# Patient Record
Sex: MALE | URBAN - METROPOLITAN AREA
[De-identification: names, ages, dates, MRNs, and addresses within clinical notes are randomized per-mention and may not be internally consistent; named-entity substitution may affect disease eponyms.]

---

## 2020-04-08 ENCOUNTER — APPOINTMENT (RX ONLY)
Dept: URBAN - METROPOLITAN AREA CLINIC 22 | Facility: CLINIC | Age: 40
Setting detail: DERMATOLOGY
End: 2020-04-08

## 2020-04-08 DIAGNOSIS — Z71.89 OTHER SPECIFIED COUNSELING: ICD-10-CM

## 2020-04-08 DIAGNOSIS — L0391 CELLULITIS AND ABSCESS OF UNSPECIFIED SITES: ICD-10-CM

## 2020-04-08 DIAGNOSIS — L81.4 OTHER MELANIN HYPERPIGMENTATION: ICD-10-CM

## 2020-04-08 DIAGNOSIS — L0390 CELLULITIS AND ABSCESS OF UNSPECIFIED SITES: ICD-10-CM

## 2020-04-08 DIAGNOSIS — D22 MELANOCYTIC NEVI: ICD-10-CM

## 2020-04-08 PROBLEM — L02.415 CUTANEOUS ABSCESS OF RIGHT LOWER LIMB: Status: ACTIVE | Noted: 2020-04-08

## 2020-04-08 PROBLEM — D22.5 MELANOCYTIC NEVI OF TRUNK: Status: ACTIVE | Noted: 2020-04-08

## 2020-04-08 PROCEDURE — 10061 I&D ABSCESS COMP/MULTIPLE: CPT

## 2020-04-08 PROCEDURE — 99203 OFFICE O/P NEW LOW 30 MIN: CPT | Mod: 25

## 2020-04-08 PROCEDURE — ? COUNSELING

## 2020-04-08 PROCEDURE — ? INCISION AND DRAINAGE

## 2020-04-08 PROCEDURE — ? ORDER TESTS

## 2020-04-08 PROCEDURE — ? PRESCRIPTION

## 2020-04-08 ASSESSMENT — LOCATION SIMPLE DESCRIPTION DERM
LOCATION SIMPLE: RIGHT THIGH
LOCATION SIMPLE: ABDOMEN

## 2020-04-08 ASSESSMENT — LOCATION ZONE DERM
LOCATION ZONE: LEG
LOCATION ZONE: TRUNK

## 2020-04-08 ASSESSMENT — LOCATION DETAILED DESCRIPTION DERM
LOCATION DETAILED: EPIGASTRIC SKIN
LOCATION DETAILED: RIGHT ANTERIOR PROXIMAL THIGH

## 2020-04-08 NOTE — PROCEDURE: INCISION AND DRAINAGE
Detail Level: Detailed
Lesion Type: Cyst
I&D Type: complicated
Method: 11 blade
Curette: Yes
Include Sutures?: No
Anesthesia Type: 1% lidocaine with epinephrine and a 1:10 solution of 8.4% sodium bicarbonate
Anesthesia Volume In Cc: 1
Packing?: iodoform packing strips
Size Of Lesion In Cm (Optional But May Be Required For Some Insurances): 0
Drainage Amount?: moderate
Drainage Type?: bloody and cyst-like
Wound Care: Petrolatum
Dressing: dry sterile dressing
Curette Text (Optional): After the contents were expressed a curette was used to partially remove the cyst wall.
Epidermal Sutures: 4-0 Ethilon
Epidermal Closure: simple interrupted
Suture Text: The incision was partially closed with
Consent was obtained and risks were reviewed including but not limited to delayed wound healing, infection, need for multiple I and D's, and pain.
Post-Care Instructions: I reviewed with the patient in detail post-care instructions. Patient should keep wound covered and call the office should any redness, pain, swelling or worsening occur.

## 2020-04-08 NOTE — PROCEDURE: ORDER TESTS
Performing Laboratory: [phone]
Billing Type: Third-Party Bill
Bill For Surgical Tray: no
Expected Date Of Service: 04/08/2020

## 2021-01-22 ENCOUNTER — PRE-ADMISSION TESTING (OUTPATIENT)
Dept: ADMISSIONS | Facility: MEDICAL CENTER | Age: 41
End: 2021-01-22
Attending: ORTHOPAEDIC SURGERY
Payer: COMMERCIAL

## 2021-01-22 DIAGNOSIS — Z01.812 PRE-OPERATIVE LABORATORY EXAMINATION: ICD-10-CM

## 2021-01-22 DIAGNOSIS — Z01.810 PRE-OPERATIVE CARDIOVASCULAR EXAMINATION: ICD-10-CM

## 2021-01-22 LAB
ANION GAP SERPL CALC-SCNC: 7 MMOL/L (ref 7–16)
BUN SERPL-MCNC: 18 MG/DL (ref 8–22)
CALCIUM SERPL-MCNC: 9.4 MG/DL (ref 8.5–10.5)
CHLORIDE SERPL-SCNC: 100 MMOL/L (ref 96–112)
CO2 SERPL-SCNC: 26 MMOL/L (ref 20–33)
CREAT SERPL-MCNC: 0.86 MG/DL (ref 0.5–1.4)
EKG IMPRESSION: NORMAL
ERYTHROCYTE [DISTWIDTH] IN BLOOD BY AUTOMATED COUNT: 43.3 FL (ref 35.9–50)
GLUCOSE SERPL-MCNC: 96 MG/DL (ref 65–99)
HCT VFR BLD AUTO: 47.2 % (ref 42–52)
HGB BLD-MCNC: 15.6 G/DL (ref 14–18)
MCH RBC QN AUTO: 30.9 PG (ref 27–33)
MCHC RBC AUTO-ENTMCNC: 33.1 G/DL (ref 33.7–35.3)
MCV RBC AUTO: 93.5 FL (ref 81.4–97.8)
PLATELET # BLD AUTO: 190 K/UL (ref 164–446)
PMV BLD AUTO: 11.6 FL (ref 9–12.9)
POTASSIUM SERPL-SCNC: 4.1 MMOL/L (ref 3.6–5.5)
RBC # BLD AUTO: 5.05 M/UL (ref 4.7–6.1)
SODIUM SERPL-SCNC: 133 MMOL/L (ref 135–145)
WBC # BLD AUTO: 7.3 K/UL (ref 4.8–10.8)

## 2021-01-22 PROCEDURE — 80048 BASIC METABOLIC PNL TOTAL CA: CPT

## 2021-01-22 PROCEDURE — 93005 ELECTROCARDIOGRAM TRACING: CPT

## 2021-01-22 PROCEDURE — 93010 ELECTROCARDIOGRAM REPORT: CPT | Performed by: INTERNAL MEDICINE

## 2021-01-22 PROCEDURE — 36415 COLL VENOUS BLD VENIPUNCTURE: CPT

## 2021-01-22 PROCEDURE — 85027 COMPLETE CBC AUTOMATED: CPT

## 2021-01-22 NOTE — PREPROCEDURE INSTRUCTIONS
Pt instructed to hold vitamins/supplements and NSAIDS per anesthesia protocol/MD instructions. Pt not currently taking any prescriptions

## 2021-01-28 ENCOUNTER — ANESTHESIA EVENT (OUTPATIENT)
Dept: SURGERY | Facility: MEDICAL CENTER | Age: 41
End: 2021-01-28
Payer: COMMERCIAL

## 2021-01-28 ENCOUNTER — ANESTHESIA (OUTPATIENT)
Dept: SURGERY | Facility: MEDICAL CENTER | Age: 41
End: 2021-01-28
Payer: COMMERCIAL

## 2021-01-28 ENCOUNTER — HOSPITAL ENCOUNTER (OUTPATIENT)
Facility: MEDICAL CENTER | Age: 41
End: 2021-01-28
Attending: ORTHOPAEDIC SURGERY | Admitting: ORTHOPAEDIC SURGERY
Payer: COMMERCIAL

## 2021-01-28 VITALS
BODY MASS INDEX: 21.87 KG/M2 | RESPIRATION RATE: 10 BRPM | WEIGHT: 170.42 LBS | SYSTOLIC BLOOD PRESSURE: 148 MMHG | HEART RATE: 74 BPM | DIASTOLIC BLOOD PRESSURE: 76 MMHG | OXYGEN SATURATION: 99 % | HEIGHT: 74 IN | TEMPERATURE: 97.1 F

## 2021-01-28 LAB
GRAM STN SPEC: NORMAL
PATHOLOGY CONSULT NOTE: NORMAL
SIGNIFICANT IND 70042: NORMAL
SITE SITE: NORMAL
SOURCE SOURCE: NORMAL

## 2021-01-28 PROCEDURE — 700111 HCHG RX REV CODE 636 W/ 250 OVERRIDE (IP): Performed by: ORTHOPAEDIC SURGERY

## 2021-01-28 PROCEDURE — A9270 NON-COVERED ITEM OR SERVICE: HCPCS | Performed by: ANESTHESIOLOGY

## 2021-01-28 PROCEDURE — 160035 HCHG PACU - 1ST 60 MINS PHASE I: Performed by: ORTHOPAEDIC SURGERY

## 2021-01-28 PROCEDURE — 160039 HCHG SURGERY MINUTES - EA ADDL 1 MIN LEVEL 3: Performed by: ORTHOPAEDIC SURGERY

## 2021-01-28 PROCEDURE — 87205 SMEAR GRAM STAIN: CPT

## 2021-01-28 PROCEDURE — 501838 HCHG SUTURE GENERAL: Performed by: ORTHOPAEDIC SURGERY

## 2021-01-28 PROCEDURE — 87070 CULTURE OTHR SPECIMN AEROBIC: CPT

## 2021-01-28 PROCEDURE — 160046 HCHG PACU - 1ST 60 MINS PHASE II: Performed by: ORTHOPAEDIC SURGERY

## 2021-01-28 PROCEDURE — 700111 HCHG RX REV CODE 636 W/ 250 OVERRIDE (IP): Performed by: ANESTHESIOLOGY

## 2021-01-28 PROCEDURE — 700105 HCHG RX REV CODE 258: Performed by: ORTHOPAEDIC SURGERY

## 2021-01-28 PROCEDURE — 87015 SPECIMEN INFECT AGNT CONCNTJ: CPT

## 2021-01-28 PROCEDURE — 87077 CULTURE AEROBIC IDENTIFY: CPT | Mod: 91

## 2021-01-28 PROCEDURE — 160025 RECOVERY II MINUTES (STATS): Performed by: ORTHOPAEDIC SURGERY

## 2021-01-28 PROCEDURE — 160009 HCHG ANES TIME/MIN: Performed by: ORTHOPAEDIC SURGERY

## 2021-01-28 PROCEDURE — 160002 HCHG RECOVERY MINUTES (STAT): Performed by: ORTHOPAEDIC SURGERY

## 2021-01-28 PROCEDURE — A9270 NON-COVERED ITEM OR SERVICE: HCPCS | Performed by: ORTHOPAEDIC SURGERY

## 2021-01-28 PROCEDURE — 160048 HCHG OR STATISTICAL LEVEL 1-5: Performed by: ORTHOPAEDIC SURGERY

## 2021-01-28 PROCEDURE — A6223 GAUZE >16<=48 NO W/SAL W/O B: HCPCS | Performed by: ORTHOPAEDIC SURGERY

## 2021-01-28 PROCEDURE — 160028 HCHG SURGERY MINUTES - 1ST 30 MINS LEVEL 3: Performed by: ORTHOPAEDIC SURGERY

## 2021-01-28 PROCEDURE — 87186 SC STD MICRODIL/AGAR DIL: CPT

## 2021-01-28 PROCEDURE — 700102 HCHG RX REV CODE 250 W/ 637 OVERRIDE(OP): Performed by: ANESTHESIOLOGY

## 2021-01-28 PROCEDURE — 88304 TISSUE EXAM BY PATHOLOGIST: CPT

## 2021-01-28 PROCEDURE — 500881 HCHG PACK, EXTREMITY: Performed by: ORTHOPAEDIC SURGERY

## 2021-01-28 PROCEDURE — 700101 HCHG RX REV CODE 250: Performed by: ANESTHESIOLOGY

## 2021-01-28 PROCEDURE — 88311 DECALCIFY TISSUE: CPT

## 2021-01-28 PROCEDURE — 87075 CULTR BACTERIA EXCEPT BLOOD: CPT

## 2021-01-28 PROCEDURE — 87147 CULTURE TYPE IMMUNOLOGIC: CPT

## 2021-01-28 PROCEDURE — 501330 HCHG SET, CYSTO IRRIG TUBING: Performed by: ORTHOPAEDIC SURGERY

## 2021-01-28 PROCEDURE — C1781 MESH (IMPLANTABLE): HCPCS | Performed by: ORTHOPAEDIC SURGERY

## 2021-01-28 PROCEDURE — 700101 HCHG RX REV CODE 250: Performed by: ORTHOPAEDIC SURGERY

## 2021-01-28 DEVICE — GENTRIX SURGICAL MATRIX 5CM X 5CM: Type: IMPLANTABLE DEVICE | Status: FUNCTIONAL

## 2021-01-28 RX ORDER — HYDROMORPHONE HYDROCHLORIDE 1 MG/ML
0.4 INJECTION, SOLUTION INTRAMUSCULAR; INTRAVENOUS; SUBCUTANEOUS
Status: DISCONTINUED | OUTPATIENT
Start: 2021-01-28 | End: 2021-01-28 | Stop reason: HOSPADM

## 2021-01-28 RX ORDER — SODIUM CHLORIDE, SODIUM LACTATE, POTASSIUM CHLORIDE, CALCIUM CHLORIDE 600; 310; 30; 20 MG/100ML; MG/100ML; MG/100ML; MG/100ML
INJECTION, SOLUTION INTRAVENOUS CONTINUOUS
Status: DISCONTINUED | OUTPATIENT
Start: 2021-01-28 | End: 2021-01-28 | Stop reason: HOSPADM

## 2021-01-28 RX ORDER — ONDANSETRON 2 MG/ML
INJECTION INTRAMUSCULAR; INTRAVENOUS PRN
Status: DISCONTINUED | OUTPATIENT
Start: 2021-01-28 | End: 2021-01-28 | Stop reason: SURG

## 2021-01-28 RX ORDER — VANCOMYCIN HYDROCHLORIDE 1 G/20ML
INJECTION, POWDER, LYOPHILIZED, FOR SOLUTION INTRAVENOUS
Status: COMPLETED | OUTPATIENT
Start: 2021-01-28 | End: 2021-01-28

## 2021-01-28 RX ORDER — LIDOCAINE HYDROCHLORIDE 20 MG/ML
INJECTION, SOLUTION EPIDURAL; INFILTRATION; INTRACAUDAL; PERINEURAL PRN
Status: DISCONTINUED | OUTPATIENT
Start: 2021-01-28 | End: 2021-01-28 | Stop reason: SURG

## 2021-01-28 RX ORDER — ONDANSETRON 2 MG/ML
4 INJECTION INTRAMUSCULAR; INTRAVENOUS
Status: DISCONTINUED | OUTPATIENT
Start: 2021-01-28 | End: 2021-01-28 | Stop reason: HOSPADM

## 2021-01-28 RX ORDER — TRANEXAMIC ACID 100 MG/ML
INJECTION, SOLUTION INTRAVENOUS PRN
Status: DISCONTINUED | OUTPATIENT
Start: 2021-01-28 | End: 2021-01-28 | Stop reason: SURG

## 2021-01-28 RX ORDER — ACETAMINOPHEN 500 MG
1000 TABLET ORAL ONCE
Status: COMPLETED | OUTPATIENT
Start: 2021-01-28 | End: 2021-01-28

## 2021-01-28 RX ORDER — ACETAMINOPHEN 500 MG
500 TABLET ORAL ONCE
COMMUNITY

## 2021-01-28 RX ORDER — MIDAZOLAM HYDROCHLORIDE 1 MG/ML
INJECTION INTRAMUSCULAR; INTRAVENOUS PRN
Status: DISCONTINUED | OUTPATIENT
Start: 2021-01-28 | End: 2021-01-28 | Stop reason: SURG

## 2021-01-28 RX ORDER — ROCURONIUM BROMIDE 10 MG/ML
INJECTION, SOLUTION INTRAVENOUS PRN
Status: DISCONTINUED | OUTPATIENT
Start: 2021-01-28 | End: 2021-01-28 | Stop reason: SURG

## 2021-01-28 RX ORDER — OXYCODONE HCL 5 MG/5 ML
10 SOLUTION, ORAL ORAL
Status: COMPLETED | OUTPATIENT
Start: 2021-01-28 | End: 2021-01-28

## 2021-01-28 RX ORDER — MEPERIDINE HYDROCHLORIDE 25 MG/ML
12.5 INJECTION INTRAMUSCULAR; INTRAVENOUS; SUBCUTANEOUS
Status: DISCONTINUED | OUTPATIENT
Start: 2021-01-28 | End: 2021-01-28 | Stop reason: HOSPADM

## 2021-01-28 RX ORDER — DEXAMETHASONE SODIUM PHOSPHATE 4 MG/ML
INJECTION, SOLUTION INTRA-ARTICULAR; INTRALESIONAL; INTRAMUSCULAR; INTRAVENOUS; SOFT TISSUE PRN
Status: DISCONTINUED | OUTPATIENT
Start: 2021-01-28 | End: 2021-01-28 | Stop reason: SURG

## 2021-01-28 RX ORDER — HYDROMORPHONE HYDROCHLORIDE 1 MG/ML
0.2 INJECTION, SOLUTION INTRAMUSCULAR; INTRAVENOUS; SUBCUTANEOUS
Status: DISCONTINUED | OUTPATIENT
Start: 2021-01-28 | End: 2021-01-28 | Stop reason: HOSPADM

## 2021-01-28 RX ORDER — HYDROMORPHONE HYDROCHLORIDE 1 MG/ML
0.1 INJECTION, SOLUTION INTRAMUSCULAR; INTRAVENOUS; SUBCUTANEOUS
Status: DISCONTINUED | OUTPATIENT
Start: 2021-01-28 | End: 2021-01-28 | Stop reason: HOSPADM

## 2021-01-28 RX ORDER — DIPHENHYDRAMINE HYDROCHLORIDE 50 MG/ML
12.5 INJECTION INTRAMUSCULAR; INTRAVENOUS
Status: DISCONTINUED | OUTPATIENT
Start: 2021-01-28 | End: 2021-01-28 | Stop reason: HOSPADM

## 2021-01-28 RX ORDER — OXYCODONE HCL 5 MG/5 ML
5 SOLUTION, ORAL ORAL
Status: COMPLETED | OUTPATIENT
Start: 2021-01-28 | End: 2021-01-28

## 2021-01-28 RX ORDER — CEFAZOLIN SODIUM 1 G/3ML
INJECTION, POWDER, FOR SOLUTION INTRAMUSCULAR; INTRAVENOUS PRN
Status: DISCONTINUED | OUTPATIENT
Start: 2021-01-28 | End: 2021-01-28 | Stop reason: SURG

## 2021-01-28 RX ORDER — LABETALOL HYDROCHLORIDE 5 MG/ML
5 INJECTION, SOLUTION INTRAVENOUS
Status: DISCONTINUED | OUTPATIENT
Start: 2021-01-28 | End: 2021-01-28 | Stop reason: HOSPADM

## 2021-01-28 RX ORDER — HYDROMORPHONE HYDROCHLORIDE 2 MG/ML
INJECTION, SOLUTION INTRAMUSCULAR; INTRAVENOUS; SUBCUTANEOUS PRN
Status: DISCONTINUED | OUTPATIENT
Start: 2021-01-28 | End: 2021-01-28 | Stop reason: SURG

## 2021-01-28 RX ADMIN — OXYCODONE HYDROCHLORIDE 10 MG: 5 SOLUTION ORAL at 08:22

## 2021-01-28 RX ADMIN — FENTANYL CITRATE 150 MCG: 50 INJECTION, SOLUTION INTRAMUSCULAR; INTRAVENOUS at 07:00

## 2021-01-28 RX ADMIN — ACETAMINOPHEN 1000 MG: 500 TABLET ORAL at 06:32

## 2021-01-28 RX ADMIN — POVIDONE IODINE 15 ML: 100 SOLUTION TOPICAL at 06:29

## 2021-01-28 RX ADMIN — LIDOCAINE HYDROCHLORIDE 0.5 ML: 10 INJECTION, SOLUTION EPIDURAL; INFILTRATION; INTRACAUDAL at 06:29

## 2021-01-28 RX ADMIN — CEFAZOLIN 2 G: 330 INJECTION, POWDER, FOR SOLUTION INTRAMUSCULAR; INTRAVENOUS at 07:02

## 2021-01-28 RX ADMIN — LIDOCAINE HYDROCHLORIDE 5 ML: 20 INJECTION, SOLUTION EPIDURAL; INFILTRATION; INTRACAUDAL at 07:03

## 2021-01-28 RX ADMIN — ROCURONIUM BROMIDE 50 MG: 10 INJECTION, SOLUTION INTRAVENOUS at 07:03

## 2021-01-28 RX ADMIN — PROPOFOL 200 MG: 10 INJECTION, EMULSION INTRAVENOUS at 07:03

## 2021-01-28 RX ADMIN — DEXAMETHASONE SODIUM PHOSPHATE 4 MG: 4 INJECTION, SOLUTION INTRA-ARTICULAR; INTRALESIONAL; INTRAMUSCULAR; INTRAVENOUS; SOFT TISSUE at 07:03

## 2021-01-28 RX ADMIN — FENTANYL CITRATE 100 MCG: 50 INJECTION, SOLUTION INTRAMUSCULAR; INTRAVENOUS at 07:15

## 2021-01-28 RX ADMIN — HYDROMORPHONE HYDROCHLORIDE 2 MG: 2 INJECTION, SOLUTION INTRAMUSCULAR; INTRAVENOUS; SUBCUTANEOUS at 07:29

## 2021-01-28 RX ADMIN — MIDAZOLAM HYDROCHLORIDE 2 MG: 1 INJECTION, SOLUTION INTRAMUSCULAR; INTRAVENOUS at 07:00

## 2021-01-28 RX ADMIN — FENTANYL CITRATE 50 MCG: 50 INJECTION, SOLUTION INTRAMUSCULAR; INTRAVENOUS at 08:22

## 2021-01-28 RX ADMIN — ONDANSETRON 4 MG: 2 INJECTION INTRAMUSCULAR; INTRAVENOUS at 07:03

## 2021-01-28 RX ADMIN — SODIUM CHLORIDE, POTASSIUM CHLORIDE, SODIUM LACTATE AND CALCIUM CHLORIDE: 600; 310; 30; 20 INJECTION, SOLUTION INTRAVENOUS at 06:30

## 2021-01-28 RX ADMIN — TRANEXAMIC ACID 1000 MG: 100 INJECTION, SOLUTION INTRAVENOUS at 07:46

## 2021-01-28 RX ADMIN — FENTANYL CITRATE 50 MCG: 50 INJECTION, SOLUTION INTRAMUSCULAR; INTRAVENOUS at 08:18

## 2021-01-28 RX ADMIN — SODIUM CHLORIDE, POTASSIUM CHLORIDE, SODIUM LACTATE AND CALCIUM CHLORIDE: 600; 310; 30; 20 INJECTION, SOLUTION INTRAVENOUS at 07:00

## 2021-01-28 SDOH — HEALTH STABILITY: MENTAL HEALTH: HOW OFTEN DO YOU HAVE 6 OR MORE DRINKS ON ONE OCCASION?: LESS THAN MONTHLY

## 2021-01-28 SDOH — HEALTH STABILITY: MENTAL HEALTH: HOW MANY STANDARD DRINKS CONTAINING ALCOHOL DO YOU HAVE ON A TYPICAL DAY?: 1 OR 2

## 2021-01-28 SDOH — HEALTH STABILITY: MENTAL HEALTH: HOW OFTEN DO YOU HAVE A DRINK CONTAINING ALCOHOL?: 2-3 TIMES A WEEK

## 2021-01-28 ASSESSMENT — PAIN DESCRIPTION - PAIN TYPE
TYPE: ACUTE PAIN;SURGICAL PAIN

## 2021-01-28 ASSESSMENT — PAIN SCALES - GENERAL: PAIN_LEVEL: 4

## 2021-01-28 NOTE — OR NURSING
Received from pacu at 854. Vss. Alert and oriented and taking po well. dc'd via wheelchair. Verbalized understanding of dc instructions.

## 2021-01-28 NOTE — DISCHARGE INSTRUCTIONS
ACTIVITY: Rest and take it easy for the first 24 hours.  A responsible adult is recommended to remain with you during that time.  It is normal to feel sleepy.  We encourage you to not do anything that requires balance, judgment or coordination.    MILD FLU-LIKE SYMPTOMS ARE NORMAL. YOU MAY EXPERIENCE GENERALIZED MUSCLE ACHES, THROAT IRRITATION, HEADACHE AND/OR SOME NAUSEA.    FOR 24 HOURS DO NOT:  Drive, operate machinery or run household appliances.  Drink beer or alcoholic beverages.   Make important decisions or sign legal documents.    SPECIAL INSTRUCTIONS:     Non weight bearing left lower extremity   Ice and elevate   Stay ahead of pain   Keep dressing clean and dry   Indomethacin 25mg three times a day for two weeks    DIET: To avoid nausea, slowly advance diet as tolerated, avoiding spicy or greasy foods for the first day.  Add more substantial food to your diet according to your physician's instructions.  Babies can be fed formula or breast milk as soon as they are hungry.  INCREASE FLUIDS AND FIBER TO AVOID CONSTIPATION.    SURGICAL DRESSING/BATHING: Keep dressing clean and dry.     FOLLOW-UP APPOINTMENT:  A follow-up appointment should be arranged with your doctor in 1-2 weeks; call to schedule.    You should CALL YOUR PHYSICIAN if you develop:  Fever greater than 101 degrees F.  Pain not relieved by medication, or persistent nausea or vomiting.  Excessive bleeding (blood soaking through dressing) or unexpected drainage from the wound.  Extreme redness or swelling around the incision site, drainage of pus or foul smelling drainage.  Inability to urinate or empty your bladder within 8 hours.  Problems with breathing or chest pain.    You should call 911 if you develop problems with breathing or chest pain.  If you are unable to contact your doctor or surgical center, you should go to the nearest emergency room or urgent care center.  Physician's telephone #: [phone]    If any questions arise, call  your doctor.  If your doctor is not available, please feel free to call the Surgical Center at [phone]. The Contact Center is open Monday through Friday 7AM to 5PM and may speak to a nurse at [phone], or toll free at [phone].     A registered nurse may call you a few days after your surgery to see how you are doing after your procedure.    MEDICATIONS: Resume taking daily medication.  Take prescribed pain medication with food.  If no medication is prescribed, you may take non-aspirin pain medication if needed.  PAIN MEDICATION CAN BE VERY CONSTIPATING.  Take a stool softener or laxative such as senokot, pericolace, or milk of magnesia if needed.    Prescriptions given in folderl.  Last pain medication given at 8:20am.    If your physician has prescribed pain medication that includes Acetaminophen (Tylenol), do not take additional Acetaminophen (Tylenol) while taking the prescribed medication.    Depression / Suicide Risk    As you are discharged from this [facility] facility, it is important to learn how to keep safe from harming yourself.    Recognize the warning signs:  · Abrupt changes in personality, positive or negative- including increase in energy   · Giving away possessions  · Change in eating patterns- significant weight changes-  positive or negative  · Change in sleeping patterns- unable to sleep or sleeping all the time   · Unwillingness or inability to communicate  · Depression  · Unusual sadness, discouragement and loneliness  · Talk of wanting to die  · Neglect of personal appearance   · Rebelliousness- reckless behavior  · Withdrawal from people/activities they love  · Confusion- inability to concentrate     If you or a loved one observes any of these behaviors or has concerns about self-harm, here's what you can do:  · Talk about it- your feelings and reasons for harming yourself  · Remove any means that you might use to hurt yourself (examples: pills, rope, extension cords,  firearm)  · Get professional help from the community (Mental Health, Substance Abuse, psychological counseling)  · Do not be alone:Call your Safe Contact- someone whom you trust who will be there for you.  · Call your local CRISIS HOTLINE 685-2947 or 082-394-1577  · Call your local Children's Mobile Crisis Response Team [place] (770) 366-0859 or www.cottonTracks  · Call the toll free National Suicide Prevention Hotlines   · National Suicide Prevention Lifeline 574-151-SQUS (6021)  · National Hope Line Network 800-SUICIDE (524-3401)

## 2021-01-28 NOTE — ANESTHESIA PREPROCEDURE EVALUATION
Relevant Problems   ANESTHESIA (within normal limits)       Physical Exam    Airway   Mallampati: II  TM distance: >3 FB  Neck ROM: full       Cardiovascular - normal exam  Rhythm: regular  Rate: normal  (-) murmur     Dental - normal exam           Pulmonary - normal exam  Breath sounds clear to auscultation     Abdominal    Neurological - normal exam                 Anesthesia Plan    ASA 2       Plan - general       Airway plan will be LMA        Induction: intravenous    Postoperative Plan: Postoperative administration of opioids is intended.    Pertinent diagnostic labs and testing reviewed    Informed Consent:    Anesthetic plan and risks discussed with patient.    Use of blood products discussed with: patient whom consented to blood products.

## 2021-01-28 NOTE — OR NURSING
Pre op admission completed.  Pt educated on surgical plan of care, all questions answered.  Bed in low position and call light within reach.  Hourly rounding in place.  Pt on BLOODLESS program.  Consent for refusal of blood signed by pt, on chart, and scanned into chart.  Paragraph #6 crossed off and initialed by pt.  Bloodless stickers on chart and wristband on pt.  Bloodless entered as an allergy.

## 2021-01-28 NOTE — PROGRESS NOTES
Med rec complete per Pt at bedside   interviewed while family present with permission from Pt   allergies reviewed

## 2021-01-28 NOTE — ANESTHESIA POSTPROCEDURE EVALUATION
Patient: Michael Klein    Procedure Summary     Date: 01/28/21 Room / Location: [place] [address] / SURGERY [facility]    Anesthesia Start: 0700 Anesthesia Stop: 0813    Procedures:       AMPUTATION, ABOVE KNEE- FOR REVISION (Right Leg Upper)      IRRIGATION AND DEBRIDEMENT, WOUND-LEG, AND EXCISION OF SINUS TRACT (Right Leg Upper) Diagnosis: (PRESSURE ULCER RIGHT LEG)    Surgeons: Antelmo Salazar M.D. Responsible Provider: Aram Emery D.O.    Anesthesia Type: general ASA Status: 2          Final Anesthesia Type: general  Last vitals  BP   NIBP: 130/83    Temp   36.8 °C (98.3 °F)    Pulse   Pulse: (!) 53   Resp   16    SpO2   96 %      Anesthesia Post Evaluation    Patient location during evaluation: PACU  Patient participation: complete - patient participated  Level of consciousness: awake and alert  Pain score: 4    Airway patency: patent  Anesthetic complications: no  Cardiovascular status: hemodynamically stable  Respiratory status: acceptable  Hydration status: euvolemic    PONV: none           Nurse Pain Score: 0 (NPRS)

## 2021-01-28 NOTE — ANESTHESIA PROCEDURE NOTES
Airway    Date/Time: 1/28/2021 7:04 AM  Performed by: Aram Emery D.O.  Authorized by: Aram Emery D.O.     Location:  OR  Urgency:  Elective  Indications for Airway Management:  Anesthesia      Spontaneous Ventilation: absent    Sedation Level:  Deep  Preoxygenated: Yes    Final Airway Type:  Supraglottic airway  Final Supraglottic Airway:  Standard LMA    SGA Size:  5  Number of Attempts at Approach:  1

## 2021-01-28 NOTE — OR SURGEON
Immediate Post OP Note    PreOp Diagnosis: Right AKA with bone growth and ulceration    PostOp Diagnosis: Same    Procedure(s):  AMPUTATION, ABOVE KNEE- FOR REVISION - Wound Class: Dirty or Infected  IRRIGATION AND DEBRIDEMENT, WOUND-LEG, AND EXCISION OF SINUS TRACT - Wound Class: Dirty or Infected    Surgeon(s):  Antelmo Salazar M.D.    Anesthesiologist/Type of Anesthesia:  Anesthesiologist: Aram Emery D.O./General    Surgical Staff:  Circulator: Jhoana Garcia R.N.  Scrub Person: Deidra Castanon    Specimens removed if any:  ID Type Source Tests Collected by Time Destination   1 : right distal femur  Bone Bone AEROBIC/ANAEROBIC CULTURE (SURGERY) Antelmo Salazar M.D. 1/28/2021  7:27 AM    A : right distal femur clean cut Bone Bone PATHOLOGY SPECIMEN Antelmo Salazar M.D. 1/28/2021  7:30 AM        Estimated Blood Loss: 50cc    TT: 19 min @ 250mmHg    Findings: Overgrown, friable bone, no sign deep infection    Complications: None        1/28/2021 8:10 AM Antelmo Salazar M.D.

## 2021-01-28 NOTE — ANESTHESIA TIME REPORT
Anesthesia Start and Stop Event Times     Date Time Event    1/28/2021 0625 Ready for Procedure     0700 Anesthesia Start     0813 Anesthesia Stop        Responsible Staff  01/28/21    Name Role Begin End    Aram Emery D.O. Anesth 0700 0813        Preop Diagnosis (Free Text):  Pre-op Diagnosis     PRESSURE ULCER RIGHT LEG        Preop Diagnosis (Codes):    Post op Diagnosis  Pressure ulcer of left leg      Premium Reason  Non-Premium    Comments:
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
## 2021-01-30 LAB
BACTERIA TISS AEROBE CULT: ABNORMAL
BACTERIA TISS AEROBE CULT: ABNORMAL
GRAM STN SPEC: ABNORMAL
SIGNIFICANT IND 70042: ABNORMAL
SITE SITE: ABNORMAL
SOURCE SOURCE: ABNORMAL

## 2021-02-01 LAB
BACTERIA SPEC ANAEROBE CULT: NORMAL
SIGNIFICANT IND 70042: NORMAL
SITE SITE: NORMAL
SOURCE SOURCE: NORMAL

## 2021-03-17 ENCOUNTER — OFFICE VISIT (OUTPATIENT)
Dept: PHYSICAL MEDICINE AND REHAB | Facility: REHABILITATION | Age: 41
End: 2021-03-17
Payer: COMMERCIAL

## 2021-03-17 VITALS
WEIGHT: 180 LBS | BODY MASS INDEX: 23.1 KG/M2 | HEART RATE: 77 BPM | HEIGHT: 74 IN | OXYGEN SATURATION: 97 % | RESPIRATION RATE: 18 BRPM | DIASTOLIC BLOOD PRESSURE: 76 MMHG | SYSTOLIC BLOOD PRESSURE: 126 MMHG

## 2021-03-17 DIAGNOSIS — L73.9 FOLLICULITIS: ICD-10-CM

## 2021-03-17 DIAGNOSIS — Z89.611 HISTORY OF RIGHT ABOVE KNEE AMPUTATION (HCC): Primary | ICD-10-CM

## 2021-03-17 PROCEDURE — 99213 OFFICE O/P EST LOW 20 MIN: CPT | Performed by: PHYSICAL MEDICINE & REHABILITATION

## 2021-03-17 NOTE — PROGRESS NOTES
[facility]  [facility]  [address] [place] [address]  Ph: [phone]    NEW PATIENT EVALUATION - AMPUTEE CLINIC      Patient Name: Michael Klein   Patient : 1980  Patient Age: 41 y.o.   PCP: Pcp Pt States None    Referring Physician: Mario Marie D.O.   Reason for Referral: Amputee  Examining Physician: Dr. Kendra Jones, DO    SUBJECTIVE:   Patient Identification: Michael Klein is a 41 y.o. male with PMH significant for HTN and rehabilitation history significant for traumatic right AKA during childhood (early ) c/b skin ulceration s/p right AKA revision, I&D, scar revision 2016 Dr. 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 Salazar and is presenting to PM&R clinic for a NEW OUTPATIENT evaluation with the following chief complaint/s:    Chief Complaint: Amputee    Background Information:  Original Date of Amputation: Early   Surgeon: Unknown - recently Dr. Salazar  Etiology: Traumatic  Acute Rehab: No  Prosthetist: Nigel   Prior Level of Function: Full time employed. Active lifestyle.   Current Level of Function: Ambulatory with ergonomic In-Motion Pro crutches due to recent surgery, recently cleared for prosthetic wear.     Accompanied by Today: Prosthetist  History of Present Illness:   - Records reviewed: Recent operative notes and operative notes from 2016  -Patient states that he recently has had surgical revision to his right lower extremity AKA.  -Biggest issue right now is that the prior prosthetic limb caused some skin issues.  -Approximately just over a year ago the patient developed 1 pustulant skin abrasion, has not gotten significantly better since.  Another one developing the same line of the socket.  Currently they are healing but he also has not worn his prosthetic limb.  Needs fitted for another prosthetic limb due to the ill fitting prior  one causing skin breakdown.  -Concerned that if they alter or fabricate a new prosthetic limb that it will potentially cause recurrence of the aforementioned skin issue.  -Fortunately patient states that he has not had back issues with neuropathic pain or phantom limb pain.  He is not on any medications for these issues.    Medication History (pertinent to amputation): None    Review of Systems:  Review of Systems   Constitutional: Negative for chills and fever.   HENT: Negative for congestion and sore throat.    Respiratory: Negative for cough and shortness of breath.    Cardiovascular: Negative for palpitations and leg swelling.   Gastrointestinal: Negative for constipation and diarrhea.   Musculoskeletal: Negative for falls and joint pain.        Right AKA.  Denies contracture.   Skin:        Folliculitis.  Right inner groin.   Neurological:        No phantom limb pain or neuropathic pain.   Endo/Heme/Allergies: Does not bruise/bleed easily.   Psychiatric/Behavioral: Negative for memory loss.      All other pertinent positive review of systems are noted above in HPI.   All other systems reviewed and are negative.    Past Medical History:  Past Medical History:   Diagnosis Date   • Hypertension     does not take meds at this time      Past Surgical History:   Procedure Laterality Date   • KNEE AMPUTATION ABOVE Right 1/28/2021    Procedure: AMPUTATION, ABOVE KNEE- FOR REVISION;  Surgeon: Antelmo Salazar M.D.;  Location: [facility];  Service: Orthopedics   • IRRIGATION & DEBRIDEMENT ORTHO Right 1/28/2021    Procedure: IRRIGATION AND DEBRIDEMENT, WOUND-LEG, AND EXCISION OF SINUS TRACT;  Surgeon: Antelmo Salazar M.D.;  Location: [facility];  Service: Orthopedics   • KNEE AMPUTATION ABOVE Right 11/29/2016    Procedure: KNEE AMPUTATION ABOVE - REVISION;  Surgeon: Antelmo Salazar M.D.;  Location: [facility];  Service:    • IRRIGATION & DEBRIDEMENT ORTHO Right 11/29/2016    Procedure:  IRRIGATION & DEBRIDEMENT ORTHO;  Surgeon: Antelmo Salazar M.D.;  Location: SURGERY [facility];  Service:    • SCAR EXCISION Right 2016    Procedure: SCAR EXCISION;  Surgeon: Antelmo Salazar M.D.;  Location: SURGERY [facility];  Service:    • OTHER ORTHOPEDIC SURGERY  2003    Above the knee amputaion        Past Social History:  Social History     Socioeconomic History   • Marital status: Single     Spouse name: Not on file   • Number of children: Not on file   • Years of education: Not on file   • Highest education level: Not on file   Occupational History   • Not on file   Tobacco Use   • Smoking status: Current Every Day Smoker     Packs/day: 0.50     Years: 2.00     Pack years: 1.00     Last attempt to quit: 2012     Years since quittin.3   • Smokeless tobacco: Never Used   • Tobacco comment: smoked for 20 years, quit for 6-7 years and restarted   Substance and Sexual Activity   • Alcohol use: Yes     Comment: 1-4/week   • Drug use: Yes     Types: Inhaled     Comment: marijuana last 2021   • Sexual activity: Not on file   Other Topics Concern   • Not on file   Social History Narrative   • Not on file     Social Determinants of Health     Financial Resource Strain:    • Difficulty of Paying Living Expenses:    Food Insecurity:    • Worried About Running Out of Food in the Last Year:    • Ran Out of Food in the Last Year:    Transportation Needs:    • Lack of Transportation (Medical):    • Lack of Transportation (Non-Medical):    Physical Activity:    • Days of Exercise per Week:    • Minutes of Exercise per Session:    Stress:    • Feeling of Stress :    Social Connections:    • Frequency of Communication with Friends and Family:    • Frequency of Social Gatherings with Friends and Family:    • Attends Sabianist Services:    • Active Member of Clubs or Organizations:    • Attends Club or Organization Meetings:    • Marital Status:    Intimate Partner Violence:    • Fear of  Current or Ex-Partner:    • Emotionally Abused:    • Physically Abused:    • Sexually Abused:         Family History:  History reviewed. No pertinent family history.      OBJECTIVE:   Vital Signs:  Vitals:    03/17/21 1305   BP: 126/76   Pulse: 77   Resp: 18   SpO2: 97%        Physical Exam:   GEN: No apparent distress  HEENT: Head normocephalic, atraumatic.  Sclera nonicteric bilaterally, no ocular discharge appreciated bilaterally.  CV: Extremities warm and well-perfused, no peripheral edema appreciated bilaterally.  PULMONARY: Breathing nonlabored on room air, no respiratory accessory muscle use.  Not requiring supplemental oxygen.  ABD: Soft, nontender.  SKIN: No appreciable skin breakdown on exposed areas of skin.  NEURO: Awake alert.  Conversational.  Logical thought content.  PSYCH: Mood and affect within normal limits.  MSK: Right AKA.  Deep skin fold along distal residual limb.  No significant contracture appreciated.  Ambulatory with ergonomic crutches.    Imaging: No imaging pertinent to today's visit.    ASSESSMENT/PLAN: Michael Klein  is a 41 y.o. male with rehabilitation history significant for traumatic right AKA, here for new amputee evaluation. The following plan was discussed with the patient who is in agreement.     Visit Diagnoses     ICD-10-CM   1. History of right above knee amputation (HCC)  Z89.611   2. Folliculitis  L73.9        Rehab/Ortho/Vasc:   1. Traumatic right AKA  -Records reviewed as aforementioned in the HPI.   -Desired and ability to use prosthesis: Patient is cognitively intact and very high we physically functioning.  Completely able to use a prosthetic limb to its fullest capability.  Not only as patient completely able to use the prosthetic but desires to use a new prosthetic limb to become more active as he had been prior.  -Reason for prosthetic and/or replacement: Recent operation changing residual limb shape requiring new socket.  No further modifications to  patient's old socket and componentry can be completed to improve fit/function given that is causing skin breakdown.  -K level: K3 Community Ambulator: Patient has the ability or potential for ambulation with variable ag, to traverse most environmental barriers, and may have vocational, therapeutic, or exercise activity that demands prosthetic utilization beyond simple locomotion.  -Daily activities and frequency: Patient works a full-time job and is very active in the community.  Prior to his most recent surgery he was physically active and wishes to get back to camping, fishing, hiking.  -Out-of-home frequency is: 5-7 times per week.  -Comorbidities and prescribed medications: None  -Prosthetic specifics: Patient would highly benefit from [employer] microprocessor componentry in order to maximize his function and to significantly reduce secondary negative sequelae from a more basic prosthetic limb which would put this young patient at significant risk for fatigue and musculoskeletal complications of his sound limb as well as the right hip.  Given the patient's age we wish to preserve his joints and much as possible over time to keep him active in the community both as a full-time worker as well as physically active to prevent other comorbidities.     Skin: Folliculitis secondary to ill fitting socket.  -Imaging: Ultrasound to ensure no loculated abscess  -Equipment: Will need new prosthetic limb given recent surgeries which changes shape of the residual limb.  -Conservative: Reduce friction and heat as much as possible.    Follow up: 6 weeks    Please note that this dictation was created using voice recognition software. I have made every reasonable attempt to correct obvious errors but there may be errors of grammar and content that I may have overlooked prior to finalization of this note.    Dr. Kendra Jones DO, MS  Department of Physical Medicine & Rehabilitation  [facility] Rehabilitation [facility]  [facility]

## 2021-03-18 ASSESSMENT — ENCOUNTER SYMPTOMS
DIARRHEA: 0
MEMORY LOSS: 0
CHILLS: 0
COUGH: 0
SORE THROAT: 0
SHORTNESS OF BREATH: 0
FEVER: 0
CONSTIPATION: 0
PALPITATIONS: 0
BRUISES/BLEEDS EASILY: 0
FALLS: 0

## 2021-03-31 ENCOUNTER — HOSPITAL ENCOUNTER (OUTPATIENT)
Dept: RADIOLOGY | Facility: MEDICAL CENTER | Age: 41
End: 2021-03-31
Attending: PHYSICAL MEDICINE & REHABILITATION
Payer: COMMERCIAL

## 2021-03-31 DIAGNOSIS — Z89.611 HISTORY OF RIGHT ABOVE KNEE AMPUTATION (HCC): ICD-10-CM

## 2021-03-31 DIAGNOSIS — L73.9 FOLLICULITIS: ICD-10-CM

## 2021-03-31 PROCEDURE — 76882 US LMTD JT/FCL EVL NVASC XTR: CPT | Mod: RT

## 2021-04-01 DIAGNOSIS — L02.91 ABSCESS: ICD-10-CM

## 2021-04-28 ENCOUNTER — OFFICE VISIT (OUTPATIENT)
Dept: PHYSICAL MEDICINE AND REHAB | Facility: REHABILITATION | Age: 41
End: 2021-04-28
Payer: COMMERCIAL

## 2021-04-28 VITALS
RESPIRATION RATE: 18 BRPM | HEART RATE: 71 BPM | DIASTOLIC BLOOD PRESSURE: 60 MMHG | TEMPERATURE: 98.6 F | BODY MASS INDEX: 23.11 KG/M2 | WEIGHT: 180 LBS | SYSTOLIC BLOOD PRESSURE: 110 MMHG | OXYGEN SATURATION: 97 %

## 2021-04-28 DIAGNOSIS — L02.91 ABSCESS: ICD-10-CM

## 2021-04-28 DIAGNOSIS — Z89.611 HISTORY OF RIGHT ABOVE KNEE AMPUTATION (HCC): Primary | ICD-10-CM

## 2021-04-28 DIAGNOSIS — L73.9 FOLLICULITIS: ICD-10-CM

## 2021-04-28 PROCEDURE — 99213 OFFICE O/P EST LOW 20 MIN: CPT | Performed by: PHYSICAL MEDICINE & REHABILITATION

## 2021-04-28 ASSESSMENT — ENCOUNTER SYMPTOMS
WEAKNESS: 0
COUGH: 0
FALLS: 0
CHILLS: 0
FOCAL WEAKNESS: 0
FEVER: 0
SHORTNESS OF BREATH: 0

## 2021-04-28 NOTE — PROGRESS NOTES
[facility]  [facility]  [address] [place] [address]  Ph: [phone]    FOLLOW UP PATIENT EVALUATION - AMPUTEE CLINIC      Patient Name: Michael Klein   Patient : 1980  Patient Age: 41 y.o.     Referring Physician: Mario Marie D.O.   Reason for Referral: Amputee  Examining Physician: Dr. Kendra Jones, DO    SUBJECTIVE:   Patient Identification: Michael Klein is a 41 y.o. male with PMH significant for HTN and rehabilitation history significant for traumatic right AKA during childhood (early ) c/b skin ulceration s/p right AKA revision, I&D, scar revision 2016 Dr. 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 Salazar and is presenting to PM&R clinic for a FOLLOW UP OUTPATIENT evaluation with the following chief complaint/s:    Chief Complaint: Amputee    Background Information:  Original Date of Amputation: Early   Surgeon: Unknown - recently Dr. Salazar  Etiology: Traumatic  Acute Rehab: No  Prosthetist: Nigel   Prior Level of Function: Full time employed. Active lifestyle.   Current Level of Function: Ambulatory with ergonomic In-Motion Pro crutches due to recent surgery, recently cleared for prosthetic wear.     Accompanied by Today: Prosthetist  History of Present Illness:   -Imaging reviewed: Ultrasound did show subcutaneous phlegmon containing 5 mm abscess in the right upper inner thigh.  -Records reviewed: Referral placed to [facility] which was authorized.  - Has had multiple adjustments to test socket and finally fitting well.   - Started mold for new socket yesterday. Will have final in two weeks.   - Has been wearing test socket for about 9 hours before.   -Really looking forward to getting back to more activities, this has been an issue for the past 6 months.    Medication History (pertinent to amputation): None    Review of  Systems:  Review of Systems   Constitutional: Negative for chills and fever.   Respiratory: Negative for cough and shortness of breath.    Musculoskeletal: Negative for falls and joint pain.        No contractures.   Skin:        Small amount of drainage at right groin abscess.  Ongoing since the past year.   Neurological: Negative for focal weakness and weakness.        No neuropathic or phantom limb pain      All other pertinent positive review of systems are noted above in HPI.   All other systems reviewed and are negative.    Past Medical History:  Past Medical History:   Diagnosis Date   • Hypertension     does not take meds at this time      Past Surgical History:   Procedure Laterality Date   • KNEE AMPUTATION ABOVE Right 1/28/2021    Procedure: AMPUTATION, ABOVE KNEE- FOR REVISION;  Surgeon: Antelmo Salazar M.D.;  [facility]: [facility]ERY [facility];  Service: Orthopedics   • IRRIGATION & DEBRIDEMENT ORTHO Right 1/28/2021    Procedure: IRRIGATION AND DEBRIDEMENT, WOUND-LEG, AND EXCISION OF SINUS TRACT;  Surgeon: Antelmo Salazar M.D.;  Location: [facility];  Service: Orthopedics   • KNEE AMPUTATION ABOVE Right 11/29/2016    Procedure: KNEE AMPUTATION ABOVE - REVISION;  Surgeon: Anetlmo Salazar M.D.;  Location: [facility];  Service:    • IRRIGATION & DEBRIDEMENT ORTHO Right 11/29/2016    Procedure: IRRIGATION & DEBRIDEMENT ORTHO;  Surgeon: Antelmo Salazar M.D.;  Location: [facility];  Service:    • SCAR EXCISION Right 11/29/2016    Procedure: SCAR EXCISION;  Surgeon: Antelmo Salazar M.D.;  Location: [facility];  Service:    • OTHER ORTHOPEDIC SURGERY  9/9/2003    Above the knee amputaion        Past Social History:  Social History     Socioeconomic History   • Marital status: Single     Spouse name: Not on file   • Number of children: Not on file   • Years of education: Not on file   • Highest education level: Not on file   Occupational History   • Not on file    Tobacco Use   • Smoking status: Current Every Day Smoker     Packs/day: 0.50     Years: 2.00     Pack years: 1.00     Last attempt to quit: 2012     Years since quittin.4   • Smokeless tobacco: Never Used   • Tobacco comment: smoked for 20 years, quit for 6-7 years and restarted   Substance and Sexual Activity   • Alcohol use: Yes     Comment: 1-4/week   • Drug use: Yes     Types: Inhaled     Comment: marijuana last 2021   • Sexual activity: Not on file   Other Topics Concern   • Not on file   Social History Narrative   • Not on file     Social Determinants of Health     Financial Resource Strain:    • Difficulty of Paying Living Expenses:    Food Insecurity:    • Worried About Running Out of Food in the Last Year:    • Ran Out of Food in the Last Year:    Transportation Needs:    • Lack of Transportation (Medical):    • Lack of Transportation (Non-Medical):    Physical Activity:    • Days of Exercise per Week:    • Minutes of Exercise per Session:    Stress:    • Feeling of Stress :    Social Connections:    • Frequency of Communication with Friends and Family:    • Frequency of Social Gatherings with Friends and Family:    • Attends Restorationism Services:    • Active Member of Clubs or Organizations:    • Attends Club or Organization Meetings:    • Marital Status:    Intimate Partner Violence:    • Fear of Current or Ex-Partner:    • Emotionally Abused:    • Physically Abused:    • Sexually Abused:         Family History:  History reviewed. No pertinent family history.      OBJECTIVE:   Vital Signs:  Vitals:    21 0737   BP: 110/60   Pulse: 71   Resp: 18   Temp: 37 °C (98.6 °F)   SpO2: 97%        Physical Exam:   GEN: No apparent distress  HEENT: Head normocephalic, atraumatic.  Sclera nonicteric bilaterally, no ocular discharge appreciated bilaterally.  CV: Extremities warm and well-perfused, no peripheral edema appreciated bilaterally.  PULMONARY: Breathing nonlabored on room air, no  respiratory accessory muscle use.  Not requiring supplemental oxygen.  SKIN: 2 areas of folliculitis right groin.  The more lateral one is larger with skin discoloration and does drain a small amount of purulent discharge with some sanguinous drainage at the end.  The more inner/medial area is better healed.  PSYCH: Mood and affect within normal limits.  NEURO: Awake alert.  Conversational.  Logical thought content.  MSK: Right AKA.  Well fitting socket.  Ambulating with single-point cane right now.    Imaging:   Ultrasound right lower extremity 3/31/2021  FINDINGS:  In the right upper inner thigh subcutaneous tissues there is a hypoechoic structure measuring 3.8 x 2.6 x 0.7 cm. This is likely a phlegmon. Within this abnormality there are 8 mm and 5 mm fluid collection suspicious for abscess     IMPRESSION:  1.  Right upper inner thigh 3.8 x 2.6 x 1.7 cm subcutaneous phlegmon containing 8 mm and 5 mm abscess  2.  No other significant finding    ASSESSMENT/PLAN: Michael Klein  is a 41 y.o. male with rehabilitation history significant for traumatic right AKA, here for new amputee evaluation. The following plan was discussed with the patient who is in agreement.     Visit Diagnoses     ICD-10-CM   1. History of right above knee amputation (HCC)  Z89.611   2. Folliculitis  L73.9   3. Abscess  L02.91        Rehab/Ortho/Vasc:   1. Traumatic right AKA  -Therapy: Continue self exercise program and activities.  -Prosthetic: Jose Manning  -Prosthetic status: Currently has test socket, well fitting patient can wear for over 9 hours.  Will have permanent socket in approximately 2 weeks, sent today to [employer] for fabrication.    Skin: Folliculitis secondary to ill fitting socket.  Chronic and ongoing for greater than 1 year.  Followed with dermatology at one point who lanced it and attempted drainage with healing, however, has not healed and appears to have continued tract from small abscess to surface through the incision  point.  Patient is at high risk for recurrent infection given that he is a very active person and will be wearing his prosthetic limb a lot which will cover this area of folliculitis.  Likely has developed a permanent tract which will continue to drain if not addressed.  Ultrasound revealed small 5 mm abscess with tract of the surface.  -Referral: General surgery for consideration of resection for better healing of this area of folliculitis.  -Counseled to avoid overheating or friction to prevent further irritation.    Follow up: 2 months to see where out from surgical perspective and prosthetic fit.    Total time spent was 16 minutes.  Included in this time is the time spent preparing for the visit including record review, my exam and evaluation, counseling and education regarding that which is aforementioned in the assessment and plan. Time was spent ordering the appropriate labs, tests, procedures, referrals, medications. Discussion involved the patient.    Please note that this dictation was created using voice recognition software. I have made every reasonable attempt to correct obvious errors but there may be errors of grammar and content that I may have overlooked prior to finalization of this note.    Dr. Kendra Jones DO, MS  Department of Physical Medicine & Rehabilitation  Neuro Rehabilitation Clinic  [facility]

## 2021-05-24 ENCOUNTER — APPOINTMENT (RX ONLY)
Dept: URBAN - METROPOLITAN AREA CLINIC 20 | Facility: CLINIC | Age: 41
Setting detail: DERMATOLOGY
End: 2021-05-24

## 2021-06-29 ENCOUNTER — OFFICE VISIT (OUTPATIENT)
Dept: PHYSICAL MEDICINE AND REHAB | Facility: REHABILITATION | Age: 41
End: 2021-06-29
Payer: COMMERCIAL

## 2021-06-29 VITALS
TEMPERATURE: 97.4 F | OXYGEN SATURATION: 97 % | HEART RATE: 62 BPM | HEIGHT: 74 IN | DIASTOLIC BLOOD PRESSURE: 80 MMHG | WEIGHT: 180 LBS | SYSTOLIC BLOOD PRESSURE: 106 MMHG | RESPIRATION RATE: 18 BRPM | BODY MASS INDEX: 23.1 KG/M2

## 2021-06-29 DIAGNOSIS — L02.91 ABSCESS: ICD-10-CM

## 2021-06-29 DIAGNOSIS — L73.9 FOLLICULITIS: ICD-10-CM

## 2021-06-29 DIAGNOSIS — Z89.611 HISTORY OF RIGHT ABOVE KNEE AMPUTATION (HCC): Primary | ICD-10-CM

## 2021-06-29 PROCEDURE — 99212 OFFICE O/P EST SF 10 MIN: CPT | Performed by: PHYSICAL MEDICINE & REHABILITATION

## 2021-06-29 NOTE — PROGRESS NOTES
[facility]  [facility]  [address] [place] [address]  Ph: [phone]    FOLLOW UP PATIENT EVALUATION - AMPUTEE CLINIC      Patient Name: Michael Klein   Patient : 1980  Patient Age: 41 y.o.     Referring Physician: Mario Marie D.O.   Reason for Referral: Amputee  Examining Physician: Dr. Kendra Jones, DO    SUBJECTIVE:   Patient Identification: Michael Klein is a 41 y.o. male with PMH significant for HTN and rehabilitation history significant for traumatic right AKA during childhood (early ) c/b skin ulceration s/p right AKA revision, I&D, scar revision 2016 Dr. 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 Salazar and is presenting to PM&R clinic for a FOLLOW UP OUTPATIENT evaluation with the following chief complaint/s:    Chief Complaint: Amputee, folliculitis    Background Information:  Original Date of Amputation: Early   Surgeon: Unknown - recently Dr. Salazar  Etiology: Traumatic  Acute Rehab: No  Prosthetist: Nigel   Prior Level of Function: Full time employed. Active lifestyle.   Current Level of Function: Ambulatory with ergonomic In-Motion Pro crutches due to recent surgery, recently cleared for prosthetic wear.     History of Present Illness:   -Imaging reviewed at last visit: Ultrasound did show subcutaneous phlegmon containing 5 mm abscess in the right upper inner thigh.  -New prosthetic limb is fitting very well.  He is able to work 2 jobs and wear the prosthetic limb for quite some time.  -Continues to have open orifice medial right inner thigh which drains purulent discharge when socket is worn for too long.  -Has authorized referral to [facility].    Medication History (pertinent to amputation): None    Review of Systems:  All positive ROS are noted above in HPI.   All other systems reviewed and are negative.    Past Medical  History:  Past Medical History:   Diagnosis Date   • Hypertension     does not take meds at this time      Past Surgical History:   Procedure Laterality Date   • KNEE AMPUTATION ABOVE Right 2021    Procedure: AMPUTATION, ABOVE KNEE- FOR REVISION;  Surgeon: Antelmo Salazar M.D.;  Location: SURGERY [facility];  Service: Orthopedics   • IRRIGATION & DEBRIDEMENT ORTHO Right 2021    Procedure: IRRIGATION AND DEBRIDEMENT, WOUND-LEG, AND EXCISION OF SINUS TRACT;  Surgeon: Antelmo Salazar M.D.;  Location: SURGERY [facility];  Service: Orthopedics   • KNEE AMPUTATION ABOVE Right 2016    Procedure: KNEE AMPUTATION ABOVE - REVISION;  Surgeon: Antelmo Salazar M.D.;  Location: SURGERY [facility];  Service:    • IRRIGATION & DEBRIDEMENT ORTHO Right 2016    Procedure: IRRIGATION & DEBRIDEMENT ORTHO;  Surgeon: Antelmo Salazar M.D.;  Location: SURGERY [facility];  Service:    • SCAR EXCISION Right 2016    Procedure: SCAR EXCISION;  Surgeon: Antelmo Salazar M.D.;  Location: SURGERY [facility];  Service:    • OTHER ORTHOPEDIC SURGERY  2003    Above the knee amputaion        Past Social History:  Social History     Socioeconomic History   • Marital status: Single     Spouse name: Not on file   • Number of children: Not on file   • Years of education: Not on file   • Highest education level: Not on file   Occupational History   • Not on file   Tobacco Use   • Smoking status: Current Every Day Smoker     Packs/day: 0.50     Years: 2.00     Pack years: 1.00     Last attempt to quit: 2012     Years since quittin.5   • Smokeless tobacco: Never Used   • Tobacco comment: smoked for 20 years, quit for 6-7 years and restarted   Vaping Use   • Vaping Use: Never used   Substance and Sexual Activity   • Alcohol use: Yes     Comment: 1-4/week   • Drug use: Yes     Types: Inhaled     Comment: marijuana last 2021   • Sexual activity: Not on file   Other Topics Concern   • Not on  file   Social History Narrative   • Not on file     Social Determinants of Health     Financial Resource Strain:    • Difficulty of Paying Living Expenses:    Food Insecurity:    • Worried About Running Out of Food in the Last Year:    • Ran Out of Food in the Last Year:    Transportation Needs:    • Lack of Transportation (Medical):    • Lack of Transportation (Non-Medical):    Physical Activity:    • Days of Exercise per Week:    • Minutes of Exercise per Session:    Stress:    • Feeling of Stress :    Social Connections:    • Frequency of Communication with Friends and Family:    • Frequency of Social Gatherings with Friends and Family:    • Attends [employer] Services:    • Active Member of Clubs or Organizations:    • Attends Club or Organization Meetings:    • Marital Status:    Intimate Partner Violence:    • Fear of Current or Ex-Partner:    • Emotionally Abused:    • Physically Abused:    • Sexually Abused:         Family History:  History reviewed. No pertinent family history.      OBJECTIVE:   Vital Signs:  Vitals:    06/29/21 0729   BP: 106/80   Pulse: 62   Resp: 18   Temp: 36.3 °C (97.4 °F)   SpO2: 97%        Physical Exam:   GEN: No apparent distress  HEENT: Head normocephalic, atraumatic.  Sclera nonicteric bilaterally, no ocular discharge appreciated bilaterally.  CV: Extremities warm and well-perfused, no peripheral edema appreciated bilaterally.  PULMONARY: Breathing nonlabored on room air, no respiratory accessory muscle use.  Not requiring supplemental oxygen.  SKIN: Draining small abscess right inner thigh.  Development of minor folliculitis just proximal to this area.  Large area of scarring.  PSYCH: Mood and affect within normal limits.  NEURO: Awake alert.  Conversational.  Logical thought content.  MSK: Right AKA.  No contracture appreciated.    ASSESSMENT/PLAN: Michael Klein  is a 41 y.o. male with rehabilitation history significant for traumatic right AKA, here for new amputee  evaluation. The following plan was discussed with the patient who is in agreement.     Visit Diagnoses     ICD-10-CM   1. History of right above knee amputation (HCC)  Z89.611   2. Folliculitis  L73.9   3. Abscess  L02.91        Rehab/Ortho/Vasc:   1. Traumatic right AKA  -Therapy: Continue self exercise program and activities.  -Prosthetist: Jose Manning  -Prosthetic status: Permanent socket and prosthetic limb functioning well.  New as of approximately 5/2021.    Skin: Folliculitis secondary to ill fitting socket.  Chronic and ongoing for greater than 1 year.  Followed with dermatology at one point who lanced it and attempted drainage with healing, however, has not healed and appears to have continued tract from small abscess to surface through the incision point.  Patient is at high risk for recurrent infection given that he is a very active person and will be wearing his prosthetic limb a lot which will cover this area of folliculitis.  Likely has developed a permanent tract which will continue to drain if not addressed.  Ultrasound revealed small 5 mm abscess with tract of the surface.  -Referral: General surgery for consideration of resection for better healing of this area of folliculitis.  -Information given today regarding authorized referral for patient to follow-up.    Follow up: Mid September.    Please note that this dictation was created using voice recognition software. I have made every reasonable attempt to correct obvious errors but there may be errors of grammar and content that I may have overlooked prior to finalization of this note.    Dr. Kendra Jones DO, MS  Department of Physical Medicine & Rehabilitation  Neuro Rehabilitation Clinic  [facility]

## 2021-07-08 ENCOUNTER — TELEPHONE (OUTPATIENT)
Dept: PHYSICAL MEDICINE AND REHAB | Facility: REHABILITATION | Age: 41
End: 2021-07-08

## 2021-09-21 ENCOUNTER — OFFICE VISIT (OUTPATIENT)
Dept: PHYSICAL MEDICINE AND REHAB | Facility: REHABILITATION | Age: 41
End: 2021-09-21
Payer: COMMERCIAL

## 2021-09-21 VITALS
BODY MASS INDEX: 23.1 KG/M2 | RESPIRATION RATE: 16 BRPM | TEMPERATURE: 98.2 F | DIASTOLIC BLOOD PRESSURE: 62 MMHG | HEIGHT: 74 IN | WEIGHT: 180 LBS | SYSTOLIC BLOOD PRESSURE: 102 MMHG | HEART RATE: 83 BPM | OXYGEN SATURATION: 99 %

## 2021-09-21 DIAGNOSIS — Z89.611 HISTORY OF RIGHT ABOVE KNEE AMPUTATION (HCC): Primary | ICD-10-CM

## 2021-09-21 DIAGNOSIS — L02.91 ABSCESS: ICD-10-CM

## 2021-09-21 DIAGNOSIS — L73.9 FOLLICULITIS: ICD-10-CM

## 2021-09-21 PROCEDURE — 99213 OFFICE O/P EST LOW 20 MIN: CPT | Performed by: PHYSICAL MEDICINE & REHABILITATION

## 2021-09-21 NOTE — PROGRESS NOTES
[facility]  [facility]  [address] [place] [address]  Ph: [phone]    FOLLOW UP PATIENT EVALUATION - AMPUTEE CLINIC      Patient Name: Michael Klein   Patient : 1980  Patient Age: 41 y.o.     Referring Physician: Mario Marie D.O.   Reason for Referral: Amputee  Examining Physician: Dr. Kendra Jones, DO    SUBJECTIVE:   Patient Identification: Michael Klein is a 41 y.o. male with PMH significant for HTN and rehabilitation history significant for traumatic right AKA during childhood (early ) c/b skin ulceration s/p right AKA revision, I&D, scar revision 2016 Dr. 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 Salazar and is presenting to PM&R clinic for a FOLLOW UP OUTPATIENT evaluation with the following chief complaint/s:    Chief Complaint: Follow-up surgery evaluation    Background Information:  Original Date of Amputation: Early   Surgeon: Unknown - recently Dr. Salazar  Etiology: Traumatic  Acute Rehab: No  Prosthetist: Nigel   Prior Level of Function: Full time employed. Active lifestyle.   Current Level of Function: Ambulatory with ergonomic In-Motion Pro crutches due to recent surgery, recently cleared for prosthetic wear.     History of Present Illness:   -Patient has in the interim been seen by general surgery.  -Patient states that he was told that they will resect the area of concern.  -States that it will be a very short outpatient procedure which he will be able to drive home from.  -Was told he can don his prosthetic limb as soon as possible.  -Has already plan to take off some work in order to accommodate for this procedure.  -Prosthetic limb continues to be very well fitting and perfectly accommodating for his active and busy lifestyle.    Medication History (pertinent to amputation): None    Review of Systems:  All positive ROS are noted  above in HPI.   All other systems reviewed and are negative.    Past Medical History:  Past Medical History:   Diagnosis Date   • Hypertension     does not take meds at this time      Past Surgical History:   Procedure Laterality Date   • KNEE AMPUTATION ABOVE Right 2021    Procedure: AMPUTATION, ABOVE KNEE- FOR REVISION;  Surgeon: Antelmo Salazar M.D.;  Location: SURGERY [facility];  Service: Orthopedics   • IRRIGATION & DEBRIDEMENT ORTHO Right 2021    Procedure: IRRIGATION AND DEBRIDEMENT, WOUND-LEG, AND EXCISION OF SINUS TRACT;  Surgeon: Antelmo Salazar M.D.;  Location: SURGERY [facility];  Service: Orthopedics   • KNEE AMPUTATION ABOVE Right 2016    Procedure: KNEE AMPUTATION ABOVE - REVISION;  Surgeon: Antelmo Salazar M.D.;  Location: SURGERY [facility];  Service:    • IRRIGATION & DEBRIDEMENT ORTHO Right 2016    Procedure: IRRIGATION & DEBRIDEMENT ORTHO;  Surgeon: Antelmo Salazar M.D.;  Location: SURGERY [facility];  Service:    • SCAR EXCISION Right 2016    Procedure: SCAR EXCISION;  Surgeon: Antelmo Salazar M.D.;  Location: SURGERY [facility];  Service:    • OTHER ORTHOPEDIC SURGERY  2003    Above the knee amputaion        Past Social History:  Social History     Socioeconomic History   • Marital status: Single     Spouse name: Not on file   • Number of children: Not on file   • Years of education: Not on file   • Highest education level: Not on file   Occupational History   • Not on file   Tobacco Use   • Smoking status: Current Every Day Smoker     Packs/day: 0.50     Years: 2.00     Pack years: 1.00     Last attempt to quit: 2012     Years since quittin.8   • Smokeless tobacco: Never Used   • Tobacco comment: smoked for 20 years, quit for 6-7 years and restarted   Vaping Use   • Vaping Use: Never used   Substance and Sexual Activity   • Alcohol use: Yes     Comment: 1-4/week   • Drug use: Yes     Types: Inhaled     Comment: marijuana last  1/26/2021   • Sexual activity: Not on file   Other Topics Concern   • Not on file   Social History Narrative   • Not on file     Social Determinants of Health     Financial Resource Strain:    • Difficulty of Paying Living Expenses:    Food Insecurity:    • Worried About Running Out of Food in the Last Year:    • Ran Out of Food in the Last Year:    Transportation Needs:    • Lack of Transportation (Medical):    • Lack of Transportation (Non-Medical):    Physical Activity:    • Days of Exercise per Week:    • Minutes of Exercise per Session:    Stress:    • Feeling of Stress :    Social Connections:    • Frequency of Communication with Friends and Family:    • Frequency of Social Gatherings with Friends and Family:    • Attends [employer] Services:    • Active Member of Clubs or Organizations:    • Attends Club or Organization Meetings:    • Marital Status:    Intimate Partner Violence:    • Fear of Current or Ex-Partner:    • Emotionally Abused:    • Physically Abused:    • Sexually Abused:         Family History:  History reviewed. No pertinent family history.      OBJECTIVE:   Vital Signs:  Vitals:    09/21/21 0733   BP: 102/62   Pulse: 83   Resp: 16   Temp: 36.8 °C (98.2 °F)   SpO2: 99%        Physical Exam:   GEN: No apparent distress  HEENT: Head normocephalic, atraumatic.  Sclera nonicteric bilaterally, no ocular discharge appreciated bilaterally.  CV: Extremities warm and well-perfused, no peripheral edema appreciated bilaterally.  PULMONARY: Breathing nonlabored on room air, no respiratory accessory muscle use.  Not requiring supplemental oxygen.  SKIN: Open tract continues to be appreciated right groin.  No drainage today.  Less erythematous today.  PSYCH: Mood and affect within normal limits.  NEURO: Awake alert.  Conversational.  Logical thought content.  Answers questions appropriately.  MSK: Right AKA    ASSESSMENT/PLAN: Michael Klein  is a 41 y.o. male with rehabilitation history significant for  traumatic right AKA, here for new amputee evaluation. The following plan was discussed with the patient who is in agreement.     Visit Diagnoses     ICD-10-CM   1. History of right above knee amputation (HCC)  Z89.611   2. Folliculitis  L73.9   3. Abscess  L02.91        Rehab/Ortho/Vasc:   1. Traumatic right AKA  -Activity: Continue self implemented exercise regimen as well as active, busy lifestyle.  -Prosthetist: Nigel  -Prosthetic status: Permanent socket and prosthetic limb well fitting.  Received 5/2021  -Surgical records reviewed: Plan for intervention upcoming in November    Skin: Folliculitis secondary to ill fitting socket.  Chronic and ongoing for greater than 1 year.  Followed with dermatology at one point who lanced it and attempted drainage with healing, however, has not healed and appears to have continued tract from small abscess to surface through the incision point.  Patient is at high risk for recurrent infection given that he is a very active person and will be wearing his prosthetic limb a lot which will cover this area of folliculitis.  Likely has developed a permanent tract which will continue to drain if not addressed.  Ultrasound revealed small 5 mm abscess with tract of the surface.  Has been seen by general surgery.  Plan for resection 11/2/2021  -Counseled that although the surgeon recommended immediate use of prosthetic limb it might be beneficial to avoid putting pressure on newly resected follicular tract to allow adequate healing.  Patient in agreement.  -Follow-up with general surgery for resection 11/2/2021    Follow up: As needed    Total time spent was 22 minutes.  Included in this time is the time spent preparing for the visit including record review, my exam and evaluation, counseling and education regarding that which is aforementioned in the assessment and plan. Discussion involved the patient.    Please note that this dictation was created using voice recognition software. I  have made every reasonable attempt to correct obvious errors but there may be errors of grammar and content that I may have overlooked prior to finalization of this note.    Dr. Kendra Jones DO, MS  Department of Physical Medicine & Rehabilitation  Neuro Rehabilitation [facility]

## 2022-04-21 ENCOUNTER — OFFICE VISIT (OUTPATIENT)
Dept: PHYSICAL MEDICINE AND REHAB | Facility: REHABILITATION | Age: 42
End: 2022-04-21
Payer: COMMERCIAL

## 2022-04-21 VITALS
HEART RATE: 70 BPM | TEMPERATURE: 98.4 F | RESPIRATION RATE: 18 BRPM | OXYGEN SATURATION: 97 % | SYSTOLIC BLOOD PRESSURE: 112 MMHG | DIASTOLIC BLOOD PRESSURE: 68 MMHG

## 2022-04-21 DIAGNOSIS — R23.9 UNSPECIFIED SKIN CHANGES: ICD-10-CM

## 2022-04-21 DIAGNOSIS — Z89.611 HISTORY OF RIGHT ABOVE KNEE AMPUTATION (HCC): Primary | ICD-10-CM

## 2022-04-21 DIAGNOSIS — R68.89 CHANGE IN WEIGHT: ICD-10-CM

## 2022-04-21 DIAGNOSIS — L73.9 FOLLICULITIS: ICD-10-CM

## 2022-04-21 PROCEDURE — 99213 OFFICE O/P EST LOW 20 MIN: CPT | Performed by: PHYSICAL MEDICINE & REHABILITATION

## 2022-04-21 ASSESSMENT — PATIENT HEALTH QUESTIONNAIRE - PHQ9: CLINICAL INTERPRETATION OF PHQ2 SCORE: 0

## 2022-04-21 NOTE — PROGRESS NOTES
[facility], [place]  [facility]  [address] [place] [address]  Ph: [phone]    FOLLOW UP PATIENT EVALUATION - AMPUTEE CLINIC      Patient Name: Michael Klein   Patient : 1980  Patient Age: 42 y.o.     Referring Physician: Mario Marie D.O.   Reason for Referral: Amputee  Examining Physician: Dr. Kendra Jones, DO    SUBJECTIVE:   Patient Identification: Michael Klein is a 42 y.o. male with PMH significant for HTN and rehabilitation history significant for traumatic right AKA during childhood (early ) c/b skin ulceration s/p right AKA revision, I&D, scar revision 2016 Dr. 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 Salazar and is presenting to PM&R clinic for a FOLLOW UP OUTPATIENT evaluation with the following chief complaint/s:    Chief Complaint: Socket Rx    Background Information:  Original Date of Amputation: Early   Surgeon: Unknown - recently Dr. Salazar  Etiology: Traumatic  Acute Rehab: No  Prosthetist: Nigel   Prior Level of Function: Full time employed. Active lifestyle.   Current Level of Function: Ambulatory with ergonomic In-Motion Pro crutches due to recent surgery, recently cleared for prosthetic wear.     History of Present Illness:   -Presents for evaluation of socket, possible needs new fabrication.  -Patient was able to get outpatient surgical intervention for his nondraining tract proximally on his residual limb.  Has had no issues with it whatsoever.  -Currently presents for possible evaluation for new socket.  Patient reports that he has stopped smoking and has been cigarette free since January.  However, that has led to a little bit of weight gain in his residual limb.  As a result of this his socket is fitting very tightly proximally.  He is getting some redness at the distal end of his prosthetic limb due to the fact that he cannot get  the residual limb fully within the prosthetic socket.  -He has worked with his prosthetists in order to try and modify the current socket by making more room, however, after multiple attempts he still is having the same issue.    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    Medication History (pertinent to amputation): None    Review of Systems:  All positive ROS are noted above in HPI.   All other systems reviewed and are negative.    Past Medical History:  Past Medical History:   Diagnosis Date   • Hypertension     does not take meds at this time      Past Surgical History:   Procedure Laterality Date   • KNEE AMPUTATION ABOVE Right 1/28/2021    Procedure: AMPUTATION, ABOVE KNEE- FOR REVISION;  Surgeon: Antelmo Salazar M.D.;  Location: [facility];  Service: Orthopedics   • IRRIGATION & DEBRIDEMENT ORTHO Right 1/28/2021    Procedure: IRRIGATION AND DEBRIDEMENT, WOUND-LEG, AND EXCISION OF SINUS TRACT;  Surgeon: Antelmo Salazar M.D.;  Location: [facility];  Service: Orthopedics   • KNEE AMPUTATION ABOVE Right 11/29/2016    Procedure: KNEE AMPUTATION ABOVE - REVISION;  Surgeon: Antelmo Salazar M.D.;  Location: [facility];  Service:    • IRRIGATION & DEBRIDEMENT ORTHO Right 11/29/2016    Procedure: IRRIGATION & DEBRIDEMENT ORTHO;  Surgeon: Antelmo Salazar M.D.;  Location: [facility];  Service:    • SCAR EXCISION Right 11/29/2016    Procedure: SCAR EXCISION;  Surgeon: Antelmo Salazar M.D.;  Location: [facility];  Service:    • OTHER ORTHOPEDIC SURGERY  9/9/2003    Above the knee amputaion        Past Social History:  Social History     Socioeconomic History   • Marital status: Single     Spouse name: Not on file   • Number of children: Not on file   • Years of education: Not on file   • Highest education level: Not on file   Occupational  History   • Not on file   Tobacco Use   • Smoking status: Current Every Day Smoker     Packs/day: 0.50     Years: 2.00     Pack years: 1.00     Last attempt to quit: 2012     Years since quittin.3   • Smokeless tobacco: Never Used   • Tobacco comment: smoked for 20 years, quit for 6-7 years and restarted   Vaping Use   • Vaping Use: Never used   Substance and Sexual Activity   • Alcohol use: Yes     Comment: 1-4/week   • Drug use: Yes     Types: Inhaled     Comment: marijuana last 2021   • Sexual activity: Not on file   Other Topics Concern   • Not on file   Social History Narrative   • Not on file     Social Determinants of Health     Financial Resource Strain: Not on file   Food Insecurity: Not on file   Transportation Needs: Not on file   Physical Activity: Not on file   Stress: Not on file   Social Connections: Not on file   Intimate Partner Violence: Not on file   Housing Stability: Not on file        Family History:  History reviewed. No pertinent family history.      OBJECTIVE:   Vital Signs:  Vitals:    22 1330   BP: 112/68   Pulse: 70   Resp: 18   Temp: 36.9 °C (98.4 °F)   SpO2: 97%        Physical Exam:   GEN: No apparent distress  HEENT: Head normocephalic, atraumatic.  Sclera nonicteric bilaterally, no ocular discharge appreciated bilaterally.  CV: Extremities warm and well-perfused, no peripheral edema appreciated bilaterally.  PULMONARY: Breathing nonlabored on room air, no respiratory accessory muscle use.  Not requiring supplemental oxygen.  MSK: Right AKA  SKIN: No appreciable skin breakdown on exposed areas of skin.  PSYCH: Mood and affect within normal limits.  NEURO: Awake alert.  Conversational.  Logical thought content. Ambulatory with prosthesis.   SKIN: Erythema and edema with some skin thickening at distal end of his residual limb.  No ulceration, signs of infection    ASSESSMENT/PLAN: Michael Klein  is a 42 y.o. male with rehabilitation history significant for  traumatic right AKA, here for new amputee evaluation. The following plan was discussed with the patient who is in agreement.     Visit Diagnoses     ICD-10-CM   1. History of right above knee amputation (HCC)  Z89.611   2. Folliculitis  L73.9   3. Change in weight  R68.89   4. Unspecified skin changes  R23.9        Rehab/Ortho/Vasc:   1. Traumatic right AKA  -Activity: Continue self implemented exercise regimen as well as active, busy lifestyle.  -Prosthetist: Nigel  2. Desired and ability to use prosthesis: Patient has desire and ability to use prosthetic limb. He has been active prosthetic wearer since time of amputation.   3. Reason for prosthetic and/or replacement: Weight change, increase in volume of residual limb, no further modifications can be completed to improve fit/function.  Patient has fortunately quit smoking and as a result has had some weight gain.  As a result they have tried to make socket modifications in order to adapt for his increased limb circumference, however, no further modifications can be made.  Patient is developing chokes syndrome and fortunately has not developed any signs of verrucous hyperplasia but this is a and consequence of continued untreated choke syndrome.  For this reason patient needs new socket fabrication to prevent further skin sequelae.  4. K level: K3  5. Daily activities and frequency: Out of home daily. Works full time during the week and on weekends. Requires prosthetic limb for independence in community.   6. Out-of-home frequency is: Daily per week.  7. Comorbidities and prescribed medications: No other medical comordities.     Skin:   1. Folliculitis secondary to ill fitting socket.  Chronic and ongoing for greater than 1 year.  Followed with dermatology at one point who lanced it and attempted drainage with healing, however, has not healed and appears to have continued tract from small abscess to surface through the incision point.  Patient is at high risk for  recurrent infection given that he is a very active person and will be wearing his prosthetic limb a lot which will cover this area of folliculitis.  Likely has developed a permanent tract which will continue to drain if not addressed.  Ultrasound revealed small 5 mm abscess with tract of the surface.  Has been seen by general surgery.  Plan for resection 11/2/2021.  Patient does have history of residual limb revision 11/2016, 1/2021. 4/21/2022 patient has had complete removal of follicular tract which had been nonhealing.  He has had no issues since.  -Resolved.    2.  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  Resolves within 20 minutes.  Skin blanching.  -Highly recommend new socket as aforementioned.  He is at high risk for worsening of skin breakdown, development of verrucous hyperplasia without fabrication of new socket given that his current one has been modified maximally without resolution of symptoms.    Follow up: As needed prosthetic needs    Total time spent was 23 minutes.  Included in this time is the time spent preparing for the visit including record review, my exam and evaluation, counseling and education regarding that which is aforementioned in the assessment and plan.  Time was spent documenting into patient's electronic health record.  Time was spent ordering the appropriate labs, tests, procedures, referrals, medications, prescriptions.  Including this time was also the time spent in care coordination with prosthetists. Some of the time included occurred outside of charting time.  Discussion involved the patient.    Please note that this dictation was created using voice recognition software. I have made every reasonable attempt to correct obvious errors but there may be errors of grammar and content that I may have overlooked prior to finalization of this note.      Kendra Jones DO, MS  Department of Physical Medicine & Rehabilitation  [facility]  [facility]

## (undated) DEVICE — BANDAGE ELASTIC 4 HONEYCOMB - 4"X5YD LF (20/CA)"

## (undated) DEVICE — TRAY SRGPRP PVP IOD WT PRP - (20/CA)

## (undated) DEVICE — DETERGENT RENUZYME PLUS 10 OZ PACKET (50/BX)

## (undated) DEVICE — SUCTION INSTRUMENT YANKAUER BULBOUS TIP W/O VENT (50EA/CA)

## (undated) DEVICE — HEAD HOLDER JUNIOR/ADULT

## (undated) DEVICE — TUBING CLEARLINK DUO-VENT - C-FLO (48EA/CA)

## (undated) DEVICE — TOWELS CLOTH SURGICAL - (4/PK 20PK/CA)

## (undated) DEVICE — SLEEVE, VASO, THIGH, MED

## (undated) DEVICE — SET IRRIGATION CYSTOSCOPY TUBE L80 IN (20EA/CA)

## (undated) DEVICE — SET LEADWIRE 5 LEAD BEDSIDE DISPOSABLE ECG (1SET OF 5/EA)

## (undated) DEVICE — SODIUM CHL IRRIGATION 0.9% 1000ML (12EA/CA)

## (undated) DEVICE — SUTURE 3-0 ETHILON FS-1 - (36/BX) 30 INCH

## (undated) DEVICE — GOWN SURGEONS X-LARGE - DISP. (30/CA)

## (undated) DEVICE — NEPTUNE 4 PORT MANIFOLD - (20/PK)

## (undated) DEVICE — ELECTRODE DUAL RETURN W/ CORD - (50/PK)

## (undated) DEVICE — GLOVE BIOGEL SZ 8 SURGICAL PF LTX - (50PR/BX 4BX/CA)

## (undated) DEVICE — DRAPE LARGE 3 QUARTER - (20/CA)

## (undated) DEVICE — SOD. CHL. INJ. 0.9% 1000 ML - (14EA/CA 60CA/PF)

## (undated) DEVICE — SET EXTENSION WITH 2 PORTS (48EA/CA) ***PART #2C8610 IS A SUBSTITUTE*****

## (undated) DEVICE — SWAB CULTURE AMIES ESWAB (50EA/PK)

## (undated) DEVICE — SUTURE GENERAL

## (undated) DEVICE — GLOVE BIOGEL INDICATOR SZ 8.5 SURGICAL PF LTX - (50/BX 4BX/CA)

## (undated) DEVICE — MASK ANESTHESIA ADULT  - (100/CA)

## (undated) DEVICE — BLADE SURGICAL #15 - (50/BX 3BX/CA)

## (undated) DEVICE — LACTATED RINGERS INJ 1000 ML - (14EA/CA 60CA/PF)

## (undated) DEVICE — PAD LAP STERILE 18 X 18 - (5/PK 40PK/CA)

## (undated) DEVICE — GOWN WARMING STANDARD FLEX - (30/CA)

## (undated) DEVICE — SUTURE 0 PDS CT-1 CR 8 X 18 (12PK/BX)"

## (undated) DEVICE — KIT ANESTHESIA W/CIRCUIT & 3/LT BAG W/FILTER (20EA/CA)

## (undated) DEVICE — DRESSING 3X8 ADAPTIC GAUZE - NON-ADHERING (36/PK 6PK/BX)

## (undated) DEVICE — SUTURE 3-0 ETHILON PS-1 (36PK/BX)

## (undated) DEVICE — ELECTRODE 850 FOAM ADHESIVE - HYDROGEL RADIOTRNSPRNT (50/PK)

## (undated) DEVICE — SENSOR SPO2 NEO LNCS ADHESIVE (20/BX) SEE USER NOTES

## (undated) DEVICE — BLADE SURGICAL #10 - (50/BX)

## (undated) DEVICE — CHLORAPREP 26 ML APPLICATOR - ORANGE TINT(25/CA)

## (undated) DEVICE — SUTURE 3-0 MONOCRYL SH (36PK/BX)

## (undated) DEVICE — CANISTER SUCTION 3000ML MECHANICAL FILTER AUTO SHUTOFF MEDI-VAC NONSTERILE LF DISP  (40EA/CA)

## (undated) DEVICE — BLADE SURGICAL CLIPPER - (50EA/CA)

## (undated) DEVICE — PADDING CAST 4 IN X 4 YDS - SOF-ROLL (12RL/BG 6BG/CT)

## (undated) DEVICE — PACK LOWER EXTREMITY - (2/CA)

## (undated) DEVICE — PROTECTOR ULNA NERVE - (36PR/CA)